# Patient Record
Sex: MALE | Race: BLACK OR AFRICAN AMERICAN | NOT HISPANIC OR LATINO | Employment: FULL TIME | URBAN - METROPOLITAN AREA
[De-identification: names, ages, dates, MRNs, and addresses within clinical notes are randomized per-mention and may not be internally consistent; named-entity substitution may affect disease eponyms.]

---

## 2023-03-19 DIAGNOSIS — F41.9 ANXIETY DISORDER, UNSPECIFIED: ICD-10-CM

## 2023-03-20 RX ORDER — ESCITALOPRAM OXALATE 20 MG/1
TABLET ORAL
Qty: 90 TABLET | Refills: 1 | Status: SHIPPED | OUTPATIENT
Start: 2023-03-20 | End: 2023-07-12 | Stop reason: SDUPTHER

## 2023-07-12 DIAGNOSIS — F41.9 ANXIETY DISORDER, UNSPECIFIED: ICD-10-CM

## 2023-07-13 RX ORDER — ESCITALOPRAM OXALATE 20 MG/1
20 TABLET ORAL DAILY
Qty: 30 TABLET | Refills: 0 | Status: SHIPPED | OUTPATIENT
Start: 2023-07-13 | End: 2023-07-26 | Stop reason: SDUPTHER

## 2023-07-25 ENCOUNTER — APPOINTMENT (OUTPATIENT)
Dept: PRIMARY CARE | Facility: CLINIC | Age: 38
End: 2023-07-25
Payer: COMMERCIAL

## 2023-07-26 ENCOUNTER — OFFICE VISIT (OUTPATIENT)
Dept: PRIMARY CARE | Facility: CLINIC | Age: 38
End: 2023-07-26
Payer: COMMERCIAL

## 2023-07-26 VITALS
HEART RATE: 94 BPM | WEIGHT: 225 LBS | DIASTOLIC BLOOD PRESSURE: 73 MMHG | SYSTOLIC BLOOD PRESSURE: 107 MMHG | BODY MASS INDEX: 29.69 KG/M2

## 2023-07-26 DIAGNOSIS — Z00.00 HEALTH CARE MAINTENANCE: ICD-10-CM

## 2023-07-26 DIAGNOSIS — M54.32 SCIATICA OF LEFT SIDE: ICD-10-CM

## 2023-07-26 DIAGNOSIS — R80.9 PROTEINURIA, UNSPECIFIED TYPE: ICD-10-CM

## 2023-07-26 DIAGNOSIS — F41.9 ANXIETY DISORDER, UNSPECIFIED: Primary | ICD-10-CM

## 2023-07-26 DIAGNOSIS — G47.33 OBSTRUCTIVE SLEEP APNEA, ADULT: ICD-10-CM

## 2023-07-26 PROBLEM — K21.9 GASTROESOPHAGEAL REFLUX DISEASE: Status: ACTIVE | Noted: 2023-07-26

## 2023-07-26 PROBLEM — N29 NEPHROCALCINOSIS: Status: ACTIVE | Noted: 2023-07-26

## 2023-07-26 PROBLEM — M54.50 LUMBAR PAIN: Status: ACTIVE | Noted: 2023-03-31

## 2023-07-26 PROBLEM — S82.62XA: Status: ACTIVE | Noted: 2023-07-26

## 2023-07-26 PROBLEM — E83.59 NEPHROCALCINOSIS: Status: ACTIVE | Noted: 2023-07-26

## 2023-07-26 PROBLEM — K90.49 FOOD INTOLERANCE: Status: ACTIVE | Noted: 2023-07-26

## 2023-07-26 PROCEDURE — 99213 OFFICE O/P EST LOW 20 MIN: CPT | Performed by: INTERNAL MEDICINE

## 2023-07-26 PROCEDURE — 1036F TOBACCO NON-USER: CPT | Performed by: INTERNAL MEDICINE

## 2023-07-26 RX ORDER — METHYLPREDNISOLONE 4 MG/1
TABLET ORAL
Qty: 21 TABLET | Refills: 0 | Status: SHIPPED | OUTPATIENT
Start: 2023-07-26 | End: 2023-08-02

## 2023-07-26 RX ORDER — ESCITALOPRAM OXALATE 20 MG/1
20 TABLET ORAL DAILY
Qty: 90 TABLET | Refills: 1 | Status: SHIPPED | OUTPATIENT
Start: 2023-07-26 | End: 2024-01-30 | Stop reason: SDUPTHER

## 2023-07-26 ASSESSMENT — PATIENT HEALTH QUESTIONNAIRE - PHQ9
SUM OF ALL RESPONSES TO PHQ9 QUESTIONS 1 AND 2: 0
2. FEELING DOWN, DEPRESSED OR HOPELESS: NOT AT ALL
1. LITTLE INTEREST OR PLEASURE IN DOING THINGS: NOT AT ALL

## 2023-07-26 NOTE — PROGRESS NOTES
Subjective   Patient ID: Marco Turner is a 38 y.o. male who presents for Follow-up.    HPI     Patient is a 38-year-old male with past medical history of anxiety who presents for follow-up.  He states that his anxiety is under control on the current dosing of the medication.  No side effects.  No recent panic attacks or anxiety spells.  He wishes to continue the current dosing of the medications.    Patient does have episodic chronic lower back pain with sciatica radiating down the left leg.  Has been using over-the-counter ibuprofen but he states been ineffective at this time.  Pain is 7 out of 10 in intensity at its worse and 3 out of 10 in intensity at its best.  Is described as dull and achy but radiates sharply down the leg for short periods of time    Review of Systems  Constitutional: No fever or chills  Cardiovascular: no chest pain, no palpitations and no syncope.   Respiratory: no cough, no shortness of breath during exertion and no shortness of breath at rest.   Gastrointestinal: no abdominal pain, no nausea and no vomiting.  Neuro: No Headache, no dizziness    Objective   /73   Pulse 94   Wt 102 kg (225 lb)   BMI 29.69 kg/m²     Physical Exam  Constitutional: Alert and in no acute distress. Well developed, well nourished  Head and Face: Head and face: Normal.    Cardiovascular: Heart rate and rhythm were normal, normal S1 and S2. No peripheral edema.   Pulmonary: No respiratory distress. Clear bilateral breath sounds.  Musculoskeletal: Gait and station: Normal. Muscle strength/tone: Normal.   Skin: Normal skin color and pigmentation, normal skin turgor, and no rash.    Psychiatric: Judgment and insight: Intact. Mood and affect: Normal.        Lab Results   Component Value Date    WBC 4.6 09/05/2021    HGB 13.7 09/05/2021    HCT 42.6 09/05/2021     09/05/2021    CHOL 163 10/07/2021    TRIG 192 (H) 10/07/2021    HDL 64.3 10/07/2021    ALT 25 10/07/2021    AST 24 10/07/2021      09/05/2021    K 4.8 09/05/2021     09/05/2021    CREATININE 1.12 09/05/2021    BUN 15 09/05/2021    CO2 31 09/05/2021       ANKLE  MRN: [id number]  Patient Name: KATE REED     STUDY:  Left ankle, 3 views.     INDICATION:  s/p ankle injury  S99.919A: Ankle injury.     COMPARISON:  10/26/2021.     ACCESSION NUMBER(S):  [id number]     ORDERING CLINICIAN:  VIVIANA HA     FINDINGS:  ORIF distal fibula/lateral malleolus with plate and screws. Hardware  is intact without perihardware fractures or lucencies.  The ankle mortise is normally aligned.  Moderate nonspecific soft tissue swelling again noted surrounding the  distal fibula.     IMPRESSION:  ORIF distal fibular fracture without hardware complication.  Moderate nonspecific soft tissue swelling surrounding the distal  fibula with similar appearance. Correlate with physical examination  findings.            Assessment/Plan   Problem List Items Addressed This Visit          Genitourinary and Reproductive    Proteinuria     Check urinalysis and kidney function         Relevant Orders    Urinalysis with Reflex Microscopic       Sleep    Obstructive sleep apnea, adult     Advised continued use of his CPAP machine regularly.            Other Visit Diagnoses       Anxiety disorder, unspecified    -  Primary    Relevant Medications    escitalopram (Lexapro) 20 mg tablet    Health care maintenance        Relevant Orders    CBC    Comprehensive metabolic panel    Lipid Panel    TSH with reflex to Free T4 if abnormal    Sciatica of left side        Relevant Medications    methylPREDNISolone (Medrol Dospak) 4 mg tablets

## 2024-01-30 ENCOUNTER — OFFICE VISIT (OUTPATIENT)
Dept: PRIMARY CARE | Facility: CLINIC | Age: 39
End: 2024-01-30
Payer: COMMERCIAL

## 2024-01-30 VITALS
WEIGHT: 226.6 LBS | BODY MASS INDEX: 29.9 KG/M2 | SYSTOLIC BLOOD PRESSURE: 154 MMHG | OXYGEN SATURATION: 97 % | DIASTOLIC BLOOD PRESSURE: 80 MMHG | HEART RATE: 78 BPM

## 2024-01-30 DIAGNOSIS — M54.9 BACK PAIN, UNSPECIFIED BACK LOCATION, UNSPECIFIED BACK PAIN LATERALITY, UNSPECIFIED CHRONICITY: ICD-10-CM

## 2024-01-30 DIAGNOSIS — N50.811 PAIN IN RIGHT TESTICLE: ICD-10-CM

## 2024-01-30 DIAGNOSIS — R05.2 SUBACUTE COUGH: Primary | ICD-10-CM

## 2024-01-30 DIAGNOSIS — F41.9 ANXIETY DISORDER, UNSPECIFIED: ICD-10-CM

## 2024-01-30 PROCEDURE — 1036F TOBACCO NON-USER: CPT | Performed by: INTERNAL MEDICINE

## 2024-01-30 PROCEDURE — 99214 OFFICE O/P EST MOD 30 MIN: CPT | Performed by: INTERNAL MEDICINE

## 2024-01-30 RX ORDER — METHYLPREDNISOLONE 4 MG/1
TABLET ORAL
Qty: 21 TABLET | Refills: 0 | Status: SHIPPED | OUTPATIENT
Start: 2024-01-30 | End: 2024-02-06

## 2024-01-30 RX ORDER — ESCITALOPRAM OXALATE 20 MG/1
20 TABLET ORAL DAILY
Qty: 90 TABLET | Refills: 1 | Status: SHIPPED | OUTPATIENT
Start: 2024-01-30

## 2024-01-30 ASSESSMENT — PATIENT HEALTH QUESTIONNAIRE - PHQ9
1. LITTLE INTEREST OR PLEASURE IN DOING THINGS: NOT AT ALL
SUM OF ALL RESPONSES TO PHQ9 QUESTIONS 1 AND 2: 0
2. FEELING DOWN, DEPRESSED OR HOPELESS: NOT AT ALL

## 2024-01-30 NOTE — ASSESSMENT & PLAN NOTE
No shortness of breath and lungs are clear to auscultation however given the duration of symptoms greater than 3 months we will order chest x-ray for further evaluation

## 2024-01-30 NOTE — PROGRESS NOTES
Subjective   Patient ID: Marco Turner is a 39 y.o. male who presents for issues (Has a few things going on).    HPI     Patient is a 39-year-old male with past medical history of anxiety who presents for follow-up.  He states that the Lexapro 20 mg is effective.  He is not need of refills.  No recent panic attacks or anxiety spells.    He is complaining of a right scrotal mass that is been present for about a month.  No dysuria or painful urination.  No recent weight loss.  It seems to affect him when he is driving in the car and pressing on the accelerator and brake.  He does not use a manual vehicle.    Patient is also complaining of lower back pain.  It flares every now and then.  Nonradiating and localized in the lower back.  Pain is 3 out of 10 in intensity.    Patient also complaining of an ongoing cough that is been lingering for 3 months.  No shortness of breath.  It did present after an upper respiratory tract infection.  The cough occurs every other day.    Review of Systems  Constitutional: No fever or chills  Cardiovascular: no chest pain, no palpitations and no syncope.   Respiratory: no cough, no shortness of breath during exertion and no shortness of breath at rest.   Gastrointestinal: no abdominal pain, no nausea and no vomiting.  Neuro: No Headache, no dizziness    Objective   /80   Pulse 78   Wt 103 kg (226 lb 9.6 oz)   SpO2 97%   BMI 29.90 kg/m²     Physical Exam  Constitutional: Alert and in no acute distress. Well developed, well nourished  Head and Face: Head and face: Normal.    Cardiovascular: Heart rate and rhythm were normal, normal S1 and S2. No peripheral edema.   Pulmonary: No respiratory distress. Clear bilateral breath sounds.  Musculoskeletal: Gait and station: Normal. Muscle strength/tone: Normal.   Skin: Normal skin color and pigmentation, normal skin turgor, and no rash.    Psychiatric: Judgment and insight: Intact. Mood and affect: Normal.        Lab Results    Component Value Date    WBC 4.6 09/05/2021    HGB 13.7 09/05/2021    HCT 42.6 09/05/2021     09/05/2021    CHOL 163 10/07/2021    TRIG 192 (H) 10/07/2021    HDL 64.3 10/07/2021    ALT 25 10/07/2021    AST 24 10/07/2021     09/05/2021    K 4.8 09/05/2021     09/05/2021    CREATININE 1.12 09/05/2021    BUN 15 09/05/2021    CO2 31 09/05/2021       ANKLE  MRN: [id number]  Patient Name: KATE REED     STUDY:  Left ankle, 3 views.     INDICATION:  s/p ankle injury  S99.919A: Ankle injury.     COMPARISON:  10/26/2021.     ACCESSION NUMBER(S):  [id number]     ORDERING CLINICIAN:  VIVIANA HA     FINDINGS:  ORIF distal fibula/lateral malleolus with plate and screws. Hardware  is intact without perihardware fractures or lucencies.  The ankle mortise is normally aligned.  Moderate nonspecific soft tissue swelling again noted surrounding the  distal fibula.     IMPRESSION:  ORIF distal fibular fracture without hardware complication.  Moderate nonspecific soft tissue swelling surrounding the distal  fibula with similar appearance. Correlate with physical examination  findings.            Assessment/Plan   Problem List Items Addressed This Visit             ICD-10-CM    Anxiety disorder, unspecified F41.9     Controlled on current medications.  No medication adjustments.  Refills provided         Relevant Medications    escitalopram (Lexapro) 20 mg tablet    Other Relevant Orders    Follow Up In [facility] - PCP - Established    Back pain M54.9     Episodic in nature and Medrol seems to help.  Will provide short course of prednisone         Relevant Medications    methylPREDNISolone (Medrol Dospak) 4 mg tablets    Other Relevant Orders    Follow Up In [facility] - PCP - Established    Subacute cough - Primary R05.2     No shortness of breath and lungs are clear to auscultation however given the duration of symptoms greater than 3 months we will order chest x-ray for further  evaluation         Relevant Orders    XR chest 2 views    Pain in right testicle N50.811     Concern for nodule around the right superior pole.  Will check ultrasound of the testicles.         Relevant Orders    US scrotum    Follow Up In [facility] - PCP - Established       Given elevated blood pressure readings will have patient follow-up in 3 months for reevaluation.  If still elevated at that time we will need to add start medications for blood pressure

## 2024-02-23 ENCOUNTER — LAB (OUTPATIENT)
Dept: LAB | Facility: LAB | Age: 39
End: 2024-02-23
Payer: COMMERCIAL

## 2024-02-23 ENCOUNTER — HOSPITAL ENCOUNTER (OUTPATIENT)
Dept: RADIOLOGY | Facility: CLINIC | Age: 39
Discharge: HOME | End: 2024-02-23
Payer: COMMERCIAL

## 2024-02-23 ENCOUNTER — APPOINTMENT (OUTPATIENT)
Dept: RADIOLOGY | Facility: CLINIC | Age: 39
End: 2024-02-23
Payer: COMMERCIAL

## 2024-02-23 DIAGNOSIS — N50.811 PAIN IN RIGHT TESTICLE: ICD-10-CM

## 2024-02-23 DIAGNOSIS — Z00.00 HEALTH CARE MAINTENANCE: ICD-10-CM

## 2024-02-23 DIAGNOSIS — R80.9 PROTEINURIA, UNSPECIFIED TYPE: ICD-10-CM

## 2024-02-23 LAB
ALBUMIN SERPL BCP-MCNC: 4.5 G/DL (ref 3.4–5)
ALP SERPL-CCNC: 55 U/L (ref 33–120)
ALT SERPL W P-5'-P-CCNC: 17 U/L (ref 10–52)
ANION GAP SERPL CALC-SCNC: 11 MMOL/L (ref 10–20)
AST SERPL W P-5'-P-CCNC: 18 U/L (ref 9–39)
BILIRUB SERPL-MCNC: 0.6 MG/DL (ref 0–1.2)
BUN SERPL-MCNC: 13 MG/DL (ref 6–23)
CALCIUM SERPL-MCNC: 9.9 MG/DL (ref 8.6–10.6)
CHLORIDE SERPL-SCNC: 103 MMOL/L (ref 98–107)
CHOLEST SERPL-MCNC: 137 MG/DL (ref 0–199)
CHOLESTEROL/HDL RATIO: 2
CO2 SERPL-SCNC: 30 MMOL/L (ref 21–32)
CREAT SERPL-MCNC: 1.1 MG/DL (ref 0.5–1.3)
EGFRCR SERPLBLD CKD-EPI 2021: 87 ML/MIN/1.73M*2
ERYTHROCYTE [DISTWIDTH] IN BLOOD BY AUTOMATED COUNT: 13.5 % (ref 11.5–14.5)
GLUCOSE SERPL-MCNC: 90 MG/DL (ref 74–99)
HCT VFR BLD AUTO: 42.5 % (ref 41–52)
HDLC SERPL-MCNC: 67.2 MG/DL
HGB BLD-MCNC: 13.9 G/DL (ref 13.5–17.5)
LDLC SERPL CALC-MCNC: 58 MG/DL
MCH RBC QN AUTO: 29.1 PG (ref 26–34)
MCHC RBC AUTO-ENTMCNC: 32.7 G/DL (ref 32–36)
MCV RBC AUTO: 89 FL (ref 80–100)
NON HDL CHOLESTEROL: 70 MG/DL (ref 0–149)
NRBC BLD-RTO: 0 /100 WBCS (ref 0–0)
PLATELET # BLD AUTO: 260 X10*3/UL (ref 150–450)
POTASSIUM SERPL-SCNC: 4.2 MMOL/L (ref 3.5–5.3)
PROT SERPL-MCNC: 7.4 G/DL (ref 6.4–8.2)
RBC # BLD AUTO: 4.78 X10*6/UL (ref 4.5–5.9)
SODIUM SERPL-SCNC: 140 MMOL/L (ref 136–145)
TRIGL SERPL-MCNC: 59 MG/DL (ref 0–149)
TSH SERPL-ACNC: 1.08 MIU/L (ref 0.44–3.98)
VLDL: 12 MG/DL (ref 0–40)
WBC # BLD AUTO: 4.4 X10*3/UL (ref 4.4–11.3)

## 2024-02-23 PROCEDURE — 84443 ASSAY THYROID STIM HORMONE: CPT

## 2024-02-23 PROCEDURE — 85027 COMPLETE CBC AUTOMATED: CPT

## 2024-02-23 PROCEDURE — 80053 COMPREHEN METABOLIC PANEL: CPT

## 2024-02-23 PROCEDURE — 80061 LIPID PANEL: CPT

## 2024-02-23 PROCEDURE — 76870 US EXAM SCROTUM: CPT | Performed by: RADIOLOGY

## 2024-02-23 PROCEDURE — 76870 US EXAM SCROTUM: CPT

## 2024-05-01 ENCOUNTER — OFFICE VISIT (OUTPATIENT)
Dept: PRIMARY CARE | Facility: CLINIC | Age: 39
End: 2024-05-01
Payer: COMMERCIAL

## 2024-05-01 VITALS
DIASTOLIC BLOOD PRESSURE: 80 MMHG | WEIGHT: 217 LBS | BODY MASS INDEX: 28.63 KG/M2 | SYSTOLIC BLOOD PRESSURE: 126 MMHG | OXYGEN SATURATION: 98 % | HEART RATE: 77 BPM

## 2024-05-01 DIAGNOSIS — N50.811 PAIN IN RIGHT TESTICLE: ICD-10-CM

## 2024-05-01 DIAGNOSIS — F41.9 ANXIETY DISORDER, UNSPECIFIED: ICD-10-CM

## 2024-05-01 DIAGNOSIS — M54.9 BACK PAIN, UNSPECIFIED BACK LOCATION, UNSPECIFIED BACK PAIN LATERALITY, UNSPECIFIED CHRONICITY: ICD-10-CM

## 2024-05-01 DIAGNOSIS — N50.9 SCROTAL LESION: Primary | ICD-10-CM

## 2024-05-01 PROCEDURE — 1036F TOBACCO NON-USER: CPT | Performed by: INTERNAL MEDICINE

## 2024-05-01 PROCEDURE — 99213 OFFICE O/P EST LOW 20 MIN: CPT | Performed by: INTERNAL MEDICINE

## 2024-05-01 NOTE — PROGRESS NOTES
Subjective   Patient ID: Marco Turner is a 39 y.o. male who presents for Follow-up.    HPI     Patient is a 39-year-old male with past medical history of anxiety who presents as follow-up from recent ultrasound of the scrotum.  There was a nonspecific structure in the right upper pole of the testes with recommendation to follow-up in 3 to 6 months.  No pain no dysuria.  No other complaints at this time    Review of Systems  Constitutional: No fever or chills  Cardiovascular: no chest pain, no palpitations and no syncope.   Respiratory: no cough, no shortness of breath during exertion and no shortness of breath at rest.   Gastrointestinal: no abdominal pain, no nausea and no vomiting.  Neuro: No Headache, no dizziness    Objective   /80   Pulse 77   Wt 98.4 kg (217 lb)   SpO2 98%   BMI 28.63 kg/m²     Physical Exam  Constitutional: Alert and in no acute distress. Well developed, well nourished  Head and Face: Head and face: Normal.    Cardiovascular: Heart rate and rhythm were normal, normal S1 and S2. No peripheral edema.   Pulmonary: No respiratory distress. Clear bilateral breath sounds.  Musculoskeletal: Gait and station: Normal. Muscle strength/tone: Normal.   Skin: Normal skin color and pigmentation, normal skin turgor, and no rash.    Psychiatric: Judgment and insight: Intact. Mood and affect: Normal.    Procedures    Lab Results   Component Value Date    WBC 4.4 02/23/2024    HGB 13.9 02/23/2024    HCT 42.5 02/23/2024     02/23/2024    CHOL 137 02/23/2024    TRIG 59 02/23/2024    HDL 67.2 02/23/2024    ALT 17 02/23/2024    AST 18 02/23/2024     02/23/2024    K 4.2 02/23/2024     02/23/2024    CREATININE 1.10 02/23/2024    BUN 13 02/23/2024    CO2 30 02/23/2024    TSH 1.08 02/23/2024       US scrotum  Narrative: Interpreted By:  Edilma Moura,   STUDY:  US SCROTUM;  2/23/2024 8:17 am      INDICATION:  Signs/Symptoms:right test nodule.      COMPARISON:  None.      ACCESSION  NUMBER(S):  [id number]      ORDERING CLINICIAN:  SHILA CARRASQUILLO      TECHNIQUE:  Multiple ultrasonographic images of scrotum and tested were obtained.      FINDINGS:  RIGHT HEMISCROTUM:      RIGHT TESTICLE:  The right testicle measures 3.36 x 2.42x 4.44 cm. Small 2 mm  hypoechoic structure in the parenchyma is present.. Normal  vascularity and Doppler waveforms are observed in the right testicle.      RIGHT EPIDIDYMIS:  The right epididymal head measures 1.21 x .85 x 1.30 cm. Epididymal  head cysts measuring up to 2 mm. Isoechoic structure with echogenic  rim adjacent to the testicle measuring 4 mm suggestive of epididymal  appendage. Small hydrocele.      LEFT HEMISCROTUM:      LEFT TESTICLE:  The left testicle measures 3.03 x 2.19 x 4.72 cm. The left testicle  demonstrates a homogeneous echotexture and normal contour. Normal  vascularity and Doppler waveforms are observed in the left testicle.      LEFT EPIDIDYMIS:  The left epididymal head measures 1.67 x 1.06 x 1.34 cm. Isoechoic  structure with echogenic rim adjacent to the testicle measuring 5 mm  suggestive of epididymal appendage.      Impression: Small hypoechoic structure in the right testicle. The appearance is  nonspecific. Recommend surveillance beginning with 3-6 month  follow-up.      Small right epididymal head cysts.      Small right hydrocele.      MACRO:  None      Signed by: Edilma Moura 2/24/2024 8:47 AM  Dictation workstation:   [id number]            Assessment/Plan   Problem List Items Addressed This Visit             ICD-10-CM    Anxiety disorder, unspecified F41.9    Back pain M54.9    Pain in right testicle N50.811    Scrotal lesion - Primary N50.9    Relevant Orders    US scrotum       Will repeat ultrasound for follow-up.  Will call with results of testing.  Follow-up for annual exam

## 2024-11-03 DIAGNOSIS — F41.9 ANXIETY DISORDER, UNSPECIFIED: ICD-10-CM

## 2024-11-05 RX ORDER — ESCITALOPRAM OXALATE 20 MG/1
20 TABLET ORAL DAILY
Qty: 90 TABLET | Refills: 0 | Status: SHIPPED | OUTPATIENT
Start: 2024-11-05

## 2025-02-07 DIAGNOSIS — F41.9 ANXIETY DISORDER, UNSPECIFIED: ICD-10-CM

## 2025-02-07 RX ORDER — ESCITALOPRAM OXALATE 20 MG/1
20 TABLET ORAL DAILY
Qty: 90 TABLET | Refills: 0 | Status: SHIPPED | OUTPATIENT
Start: 2025-02-07

## 2025-05-13 ENCOUNTER — APPOINTMENT (OUTPATIENT)
Dept: PRIMARY CARE | Facility: CLINIC | Age: 40
End: 2025-05-13
Payer: COMMERCIAL

## 2025-05-13 VITALS
SYSTOLIC BLOOD PRESSURE: 132 MMHG | BODY MASS INDEX: 27.96 KG/M2 | WEIGHT: 211 LBS | HEIGHT: 73 IN | DIASTOLIC BLOOD PRESSURE: 86 MMHG | OXYGEN SATURATION: 97 % | HEART RATE: 76 BPM

## 2025-05-13 DIAGNOSIS — F41.9 ANXIETY DISORDER, UNSPECIFIED: ICD-10-CM

## 2025-05-13 DIAGNOSIS — Z00.00 HEALTH CARE MAINTENANCE: Primary | ICD-10-CM

## 2025-05-13 DIAGNOSIS — M54.50 LOW BACK PAIN, UNSPECIFIED BACK PAIN LATERALITY, UNSPECIFIED CHRONICITY, UNSPECIFIED WHETHER SCIATICA PRESENT: ICD-10-CM

## 2025-05-13 DIAGNOSIS — K29.70 GASTRITIS WITHOUT BLEEDING, UNSPECIFIED CHRONICITY, UNSPECIFIED GASTRITIS TYPE: ICD-10-CM

## 2025-05-13 PROBLEM — R05.2 SUBACUTE COUGH: Status: RESOLVED | Noted: 2024-01-30 | Resolved: 2025-05-13

## 2025-05-13 PROBLEM — N50.9 SCROTAL LESION: Status: RESOLVED | Noted: 2024-05-01 | Resolved: 2025-05-13

## 2025-05-13 PROCEDURE — 99396 PREV VISIT EST AGE 40-64: CPT | Performed by: INTERNAL MEDICINE

## 2025-05-13 PROCEDURE — 1036F TOBACCO NON-USER: CPT | Performed by: INTERNAL MEDICINE

## 2025-05-13 PROCEDURE — 3008F BODY MASS INDEX DOCD: CPT | Performed by: INTERNAL MEDICINE

## 2025-05-13 RX ORDER — METHYLPREDNISOLONE 4 MG/1
TABLET ORAL
Qty: 21 TABLET | Refills: 0 | Status: SHIPPED | OUTPATIENT
Start: 2025-05-13 | End: 2025-05-19

## 2025-05-13 RX ORDER — ESCITALOPRAM OXALATE 20 MG/1
20 TABLET ORAL DAILY
Qty: 90 TABLET | Refills: 3 | Status: SHIPPED | OUTPATIENT
Start: 2025-05-13

## 2025-05-13 RX ORDER — OMEPRAZOLE 40 MG/1
40 CAPSULE, DELAYED RELEASE ORAL
Qty: 30 CAPSULE | Refills: 0 | Status: SHIPPED | OUTPATIENT
Start: 2025-05-13 | End: 2025-06-12

## 2025-05-13 NOTE — LETTER
May 13, 2025     No Recipients    Patient: Marco Turner   YOB: 1985   Date of Visit: 5/13/2025     Dear Dr. Dunbar Recipients:    Thank you for referring Marco Turner to me for evaluation. Below are the relevant portions of my assessment and plan of care.    If you have questions, please do not hesitate to call me. I look forward to following Marco along with you.         Sincerely,        Chaz Bolanos, DO        CC: No Recipients      Progress Notes:

## 2025-05-13 NOTE — PROGRESS NOTES
"Subjective   Patient ID: Marco Turner is a 40 y.o. male who presents for Annual Exam.          HPI     Patient is a 40-year-old male with past medical history of anxiety disorder presents for wellness.  No specific complaints at this time.  He needs refills of his medications.  He works as a .  He has 3 children at home.  Had a vasectomy.  Overall doing well.  He has some lower back pain and states that steroids in the past have been helpful and is requesting a refill.  No early colon cancer or heart disease in the family.  Exercises regularly.  Overall no specific complaints at this time.  Due for laboratory testing.  He has obstructive sleep apnea but does not use a CPAP.  He uses an oral device.    Review of Systems  Constitutional: No fever or chills, No Night Sweats  Eyes: No Blurry Vision or Eye sight problems  ENT: No Nasal Discharge, Hoarseness, sore throat  Cardiovascular: no chest pain, no palpitations and no syncope.   Respiratory: no cough, no shortness of breath during exertion and no shortness of breath at rest.   Gastrointestinal: no abdominal pain, no nausea and no vomiting.   : No issues with urinary stream, burning with urination, no blood in urine or stools  Skin: No Skin rashes or Lesions  Neuro: No Headache, no dizziness or Numbness or tingling  Psych: No Anxiety, depression or sleeping problems  Heme: No Easy bleeding or brusing.     Objective   /86   Pulse 76   Ht 1.854 m (6' 1\")   Wt 95.7 kg (211 lb)   SpO2 97%   BMI 27.84 kg/m²     Physical Exam  Constitutional: Alert and in no acute distress. Well developed, well nourished.   Head and Face: Head and face: Normal.    Eyes: Normal external exam. Pupils were equal in size, round, reactive to light (PERRL) with normal accommodation and extraocular movements intact (EOMI).   Ears, Nose, Mouth, and Throat: External inspection of ears and nose: Normal.  Hearing: Normal.  Nasal mucosa, septum, and turbinates: Normal.  Lips, " teeth, and gums: Normal.  Oropharynx: Normal.   Neck: No neck mass was observed. Supple. Thyroid not enlarged and there were no palpable thyroid nodules.   Cardiovascular: Heart rate and rhythm were normal, normal S1 and S2. Pedal pulses: Normal. No peripheral edema.   Pulmonary: No respiratory distress. Clear bilateral breath sounds.   Abdomen: Soft nontender; no abdominal mass palpated. Normal bowel sounds. No organomegaly.   : Deferred  Musculoskeletal: No joint swelling seen, normal movements of all extremities. Range of motion: Normal.  Muscle strength/tone: Normal.    Skin: Normal skin color and pigmentation, normal skin turgor, and no rash.   Neurologic: Deep tendon reflexes were 2+ and symmetric.   Psychiatric: Judgment and insight: Intact. Mood and affect: Normal.  Lymphatic: No cervical lymphadenopathy. Palpation of lymph nodes in axillae: Normal.  Palpation of lymph nodes in groin: Normal.    Lab Results   Component Value Date    WBC 4.4 02/23/2024    HGB 13.9 02/23/2024    HCT 42.5 02/23/2024     02/23/2024    CHOL 137 02/23/2024    TRIG 59 02/23/2024    HDL 67.2 02/23/2024    ALT 17 02/23/2024    AST 18 02/23/2024     02/23/2024    K 4.2 02/23/2024     02/23/2024    CREATININE 1.10 02/23/2024    BUN 13 02/23/2024    CO2 30 02/23/2024    TSH 1.08 02/23/2024       US scrotum  Narrative: Interpreted By:  Edilma Moura,   STUDY:  US SCROTUM;  2/23/2024 8:17 am      INDICATION:  Signs/Symptoms:right test nodule.      COMPARISON:  None.      ACCESSION NUMBER(S):  [id number]      ORDERING CLINICIAN:  SHILA CARRASQUILLO      TECHNIQUE:  Multiple ultrasonographic images of scrotum and tested were obtained.      FINDINGS:  RIGHT HEMISCROTUM:      RIGHT TESTICLE:  The right testicle measures 3.36 x 2.42x 4.44 cm. Small 2 mm  hypoechoic structure in the parenchyma is present.. Normal  vascularity and Doppler waveforms are observed in the right testicle.      RIGHT EPIDIDYMIS:  The right  epididymal head measures 1.21 x .85 x 1.30 cm. Epididymal  head cysts measuring up to 2 mm. Isoechoic structure with echogenic  rim adjacent to the testicle measuring 4 mm suggestive of epididymal  appendage. Small hydrocele.      LEFT HEMISCROTUM:      LEFT TESTICLE:  The left testicle measures 3.03 x 2.19 x 4.72 cm. The left testicle  demonstrates a homogeneous echotexture and normal contour. Normal  vascularity and Doppler waveforms are observed in the left testicle.      LEFT EPIDIDYMIS:  The left epididymal head measures 1.67 x 1.06 x 1.34 cm. Isoechoic  structure with echogenic rim adjacent to the testicle measuring 5 mm  suggestive of epididymal appendage.      Impression: Small hypoechoic structure in the right testicle. The appearance is  nonspecific. Recommend surveillance beginning with 3-6 month  follow-up.      Small right epididymal head cysts.      Small right hydrocele.      MACRO:  None      Signed by: Edilma Moura 2/24/2024 8:47 AM  Dictation workstation:   [id number]      Assessment/Plan   Problem List Items Addressed This Visit           ICD-10-CM    Anxiety disorder, unspecified F41.9    Relevant Medications    escitalopram (Lexapro) 20 mg tablet    Back pain M54.9    Relevant Medications    methylPREDNISolone (Medrol Dospak) 4 mg tablets     Other Visit Diagnoses         Codes      Health care maintenance    -  Primary Z00.00    Relevant Orders    CBC    Comprehensive metabolic panel    Lipid Panel    Prostate Spec.Ag,Screen    Albumin-Creatinine Ratio, Urine Random      Gastritis without bleeding, unspecified chronicity, unspecified gastritis type     K29.70    Relevant Medications    omeprazole (PriLOSEC) 40 mg DR charlie              Dear Marco Turner     It was my pleasure to take care of you today in the office. Below are the things we discussed today:    1. Immunizations: Yearly Flu shot is recommended.        Deferring tetanus and other vaccines at this time  2. Blood Work:  3.  Seen your dentist twice a year  4. Yearly Eye exam is recommended    5. BMI: 27.8  6: Diet recommendations:   Eat Clean, Try to have as many home cooked meals as possible  Avoid processed foods which contain excess calories, sugar, and sodium.    7. Exercise recommendations:   150 minutes a week to maintain your weight     If you have to loose weight, you need a better diet and exercise plan.     8. Please get your Living will / Advance directive completed if you do not have one already. Please make sure our office has a copy of the latest one.     9. Colonoscopy: Start at age 45  10. PSA: Ordered    11.  Follow-up annually or as needed    Follow up in one year for a Physical. Please call the office before your Physical to see if you need blood work completed prior to your physical.     Please call me if any questions arise from now until your next visit. I will call you after I am done seeing patients. A Doctor is always available by phone when the office is closed. Please feel free to call for help with any problem that you feel shouldn't wait until the office re-opens.

## 2025-05-14 LAB
ALBUMIN SERPL-MCNC: 5 G/DL (ref 3.6–5.1)
ALBUMIN/CREAT UR: 12 MG/G CREAT
ALP SERPL-CCNC: 58 U/L (ref 36–130)
ALT SERPL-CCNC: 20 U/L (ref 9–46)
ANION GAP SERPL CALCULATED.4IONS-SCNC: 11 MMOL/L (CALC) (ref 7–17)
AST SERPL-CCNC: 28 U/L (ref 10–40)
BILIRUB SERPL-MCNC: 0.4 MG/DL (ref 0.2–1.2)
BUN SERPL-MCNC: 14 MG/DL (ref 7–25)
CALCIUM SERPL-MCNC: 10.3 MG/DL (ref 8.6–10.3)
CHLORIDE SERPL-SCNC: 102 MMOL/L (ref 98–110)
CHOLEST SERPL-MCNC: 147 MG/DL
CHOLEST/HDLC SERPL: 2.1 (CALC)
CO2 SERPL-SCNC: 28 MMOL/L (ref 20–32)
CREAT SERPL-MCNC: 1.16 MG/DL (ref 0.6–1.29)
CREAT UR-MCNC: 233 MG/DL (ref 20–320)
EGFRCR SERPLBLD CKD-EPI 2021: 81 ML/MIN/1.73M2
ERYTHROCYTE [DISTWIDTH] IN BLOOD BY AUTOMATED COUNT: 13.1 % (ref 11–15)
GLUCOSE SERPL-MCNC: 91 MG/DL (ref 65–99)
HCT VFR BLD AUTO: 43.5 % (ref 38.5–50)
HDLC SERPL-MCNC: 71 MG/DL
HGB BLD-MCNC: 14.4 G/DL (ref 13.2–17.1)
LDLC SERPL CALC-MCNC: 59 MG/DL (CALC)
MCH RBC QN AUTO: 29.8 PG (ref 27–33)
MCHC RBC AUTO-ENTMCNC: 33.1 G/DL (ref 32–36)
MCV RBC AUTO: 90.1 FL (ref 80–100)
MICROALBUMIN UR-MCNC: 2.7 MG/DL
NONHDLC SERPL-MCNC: 76 MG/DL (CALC)
PLATELET # BLD AUTO: 260 THOUSAND/UL (ref 140–400)
PMV BLD REES-ECKER: 9.9 FL (ref 7.5–12.5)
POTASSIUM SERPL-SCNC: 4.6 MMOL/L (ref 3.5–5.3)
PROT SERPL-MCNC: 8.1 G/DL (ref 6.1–8.1)
PSA SERPL-MCNC: 0.82 NG/ML
RBC # BLD AUTO: 4.83 MILLION/UL (ref 4.2–5.8)
SODIUM SERPL-SCNC: 141 MMOL/L (ref 135–146)
TRIGL SERPL-MCNC: 90 MG/DL
WBC # BLD AUTO: 5 THOUSAND/UL (ref 3.8–10.8)